# Patient Record
Sex: MALE | Race: WHITE | NOT HISPANIC OR LATINO | Employment: OTHER | URBAN - METROPOLITAN AREA
[De-identification: names, ages, dates, MRNs, and addresses within clinical notes are randomized per-mention and may not be internally consistent; named-entity substitution may affect disease eponyms.]

---

## 2021-05-04 ENCOUNTER — HISTORICAL (OUTPATIENT)
Dept: ANESTHESIOLOGY | Facility: HOSPITAL | Age: 81
End: 2021-05-04

## 2022-03-30 ENCOUNTER — HISTORICAL (OUTPATIENT)
Dept: ADMINISTRATIVE | Facility: HOSPITAL | Age: 82
End: 2022-03-30

## 2022-03-30 ENCOUNTER — HISTORICAL (OUTPATIENT)
Dept: RADIOLOGY | Facility: HOSPITAL | Age: 82
End: 2022-03-30

## 2022-04-12 ENCOUNTER — HISTORICAL (OUTPATIENT)
Dept: ADMINISTRATIVE | Facility: HOSPITAL | Age: 82
End: 2022-04-12

## 2022-04-30 VITALS
HEIGHT: 67 IN | BODY MASS INDEX: 25.74 KG/M2 | DIASTOLIC BLOOD PRESSURE: 64 MMHG | WEIGHT: 164 LBS | SYSTOLIC BLOOD PRESSURE: 130 MMHG

## 2022-04-30 NOTE — OP NOTE
Patient:   Moses Eugene             MRN: [id number]            FIN: [id number]               Age:   80 years     Sex:  Male     :  1940   Associated Diagnoses:   Abnormal weight loss   Author:   Dolly Vale MD      Operative Note   Operative Information   Date/ Time:  2021 08:58:00.     Procedures Performed: Procedure Code   Colonoscopy, flexible; with biopsy, single or multiple (30199).  Colonoscopy, flexible; diagnostic, including collection of specimen(s) by brushing or washing, when performed (separate procedure) (79320).  Colonoscopy through stoma; with directed submucosal injection(s), any substance (45248)..     Indications: 80-year-old white male with recent history of weight loss referred for diagnostic colonoscopy for evaluation of possible GI pathology.     Preoperative Diagnosis: Abnormal weight loss (XHB51-UH R63.4).     Postoperative Diagnosis: Abnormal weight loss (QUY09-SF R63.4).     Surgeon: Dolly Vale MD.     Anesthesia: Intravenous MAC.     Speciman Removed: Ascending colon nodule/polyp.     Description of Procedure/Findings/    Complications: Patient was brought to the endoscopy suite laid in the left lateral decubitus position right set up.  Intravenous anesthesia was provided.  Digital rectal exam performed exhibiting good anal rectal tone and no masses.  An endoscope was then passed through the anus intubating the rectum and with gentle deflation reaching the cecum.  Upon reaching the cecum pictures were taken the scope was then slowly withdrawn.  Overall the cecum appeared to be grossly normal.  Within the mid to proximal ascending colon an abnormal appearing region of colonic mucosa was identified.  It was either a very sessile polyp or abnormal mucosa but in either case it was biopsied and the region was injected with submucosal ink.  Scope was further withdrawn the remainder of the ascending transverse descending and rectosigmoid colon appear to be  grossly normal otherwise.  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  Scope was retroflexed in the distal rectum revealing normal-appearing perianal mucosa no significant hemorrhoids.  He was returned to neutral position the colon was evacuated of air.  Patient was relieved of anesthesia stable condition and transfer the postanesthesia care unit..     Esimated blood loss: loss  2  cc.     Complications: None.

## 2024-02-20 ENCOUNTER — HOSPITAL ENCOUNTER (EMERGENCY)
Facility: HOSPITAL | Age: 84
Discharge: HOME | End: 2024-02-20
Attending: INTERNAL MEDICINE
Payer: COMMERCIAL

## 2024-02-20 VITALS
WEIGHT: 169 LBS | RESPIRATION RATE: 18 BRPM | OXYGEN SATURATION: 95 % | TEMPERATURE: 99 F | SYSTOLIC BLOOD PRESSURE: 138 MMHG | HEART RATE: 66 BPM | BODY MASS INDEX: 26.49 KG/M2 | DIASTOLIC BLOOD PRESSURE: 87 MMHG
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
## 2024-02-20 DIAGNOSIS — S39.012A STRAIN OF BACK, INITIAL ENCOUNTER: Primary | ICD-10-CM

## 2024-02-20 DIAGNOSIS — M81.0 OSTEOPOROSIS, UNSPECIFIED OSTEOPOROSIS TYPE, UNSPECIFIED PATHOLOGICAL FRACTURE PRESENCE: ICD-10-CM

## 2024-02-20 PROCEDURE — 99284 EMERGENCY DEPT VISIT MOD MDM: CPT | Mod: 25

## 2024-02-20 RX ORDER — NAPROXEN 500 MG/1
500 TABLET ORAL 2 TIMES DAILY WITH MEALS
Qty: 30 TABLET | Refills: 0 | Status: SHIPPED | OUTPATIENT
Start: 2024-02-20 | End: 2024-03-06

## 2024-02-20 NOTE — ED PROVIDER NOTES
Encounter Date: 2/20/2024   History from patient     History     Chief Complaint   Patient presents with    Fall     C/o fall last night with lower back pain today     HPI    Moses Eugene is 83 y.o. male who  has a past medical history of Anxiety disorder, unspecified, Diabetes mellitus, Hypercholesterolemia, and Hypertension. arrives in ER with c/o Fall (C/o fall last night with lower back pain today)      Review of patient's allergies indicates:  No Known Allergies  Past Medical History:   Diagnosis Date    Anxiety disorder, unspecified     Diabetes mellitus     Hypercholesterolemia     Hypertension      Past Surgical History:   Procedure Laterality Date    CORONARY ARTERY BYPASS GRAFT      HERNIA REPAIR Right     Inguinal     Family History   Problem Relation Age of Onset    Hypertension Mother     Heart disease Mother     Hypertension Father     Heart disease Father      Social History     Tobacco Use    Smoking status: Never    Smokeless tobacco: Never   Substance Use Topics    Alcohol use: Not Currently    Drug use: Never     Review of Systems   Constitutional:  Negative for fever.   HENT:  Negative for trouble swallowing and voice change.    Eyes:  Negative for visual disturbance.   Respiratory:  Negative for cough and shortness of breath.    Cardiovascular:  Negative for chest pain.   Gastrointestinal:  Negative for abdominal pain, diarrhea and vomiting.   Genitourinary:  Negative for dysuria and hematuria.   Musculoskeletal:  Positive for back pain. Negative for gait problem.   Skin:  Negative for color change and rash.         Skin break on the left thumb   Neurological:  Negative for headaches.   Psychiatric/Behavioral:  Negative for behavioral problems and sleep disturbance.    All other systems reviewed and are negative.      Physical Exam     Initial Vitals [02/20/24 0947]   BP Pulse Resp Temp SpO2   128/66 (!) 54 18 98.6 °F (37 °C) 98 %      MAP       --         Physical Exam    Nursing note and  vitals reviewed.  Constitutional: He appears well-developed.   Eyes: EOM are normal. Pupils are equal, round, and reactive to light.   Neck: Neck supple.   Cardiovascular:  Normal rate.           Pulmonary/Chest: Breath sounds normal.   Abdominal: Abdomen is soft. Bowel sounds are normal.   Musculoskeletal:      Right shoulder: No tenderness.      Left shoulder: Tenderness present. No bony tenderness. Normal range of motion.      Right upper arm: No tenderness.      Right forearm: No tenderness.        Arms:       Cervical back: Neck supple. No tenderness or bony tenderness.      Thoracic back: Tenderness present. No bony tenderness.      Lumbar back: Tenderness present. No bony tenderness.        Back:      Neurological: He is alert. GCS score is 15. GCS eye subscore is 4. GCS verbal subscore is 5. GCS motor subscore is 6.   Skin:    V-shaped skin tear on the left thumb         ED Course   Procedures  Labs Reviewed - No data to display       Imaging Results              X-Ray Shoulder Trauma Left (Final result)  Result time 02/20/24 14:57:53      Final result by Ishan Keith MD (02/20/24 14:57:53)                   Impression:      1. No acute osseous defect identified  2. Osteoarthritis  3. Osteopenia  4. MR examination would allow further evaluation if clinically indicated      Electronically signed by: Ishan Keith  Date:    02/20/2024  Time:    14:57               Narrative:    EXAMINATION:  XR SHOULDER TRAUMA 3 VIEW LEFT    CLINICAL HISTORY:  Unspecified fall, initial encounter; .    COMPARISON:  None available.    FINDINGS:  Internal rotation, external rotation, and transscapular Y-views revealno definite fracture or dislocation.  Arthritic changes at the acromioclavicular and glenohumeral joint.  Bony structures are osteopenic.  No aggressive osteolytic or osteoblastic lesion is seen.                        Wet Read by Luz Elena Lucas MD (02/20/24 12:59:20, [facility] - Emergency Dept,  Emergency Medicine)     X-Ray, Independently Interpreted by Luz Elena Lucas MD.   Left shoulder three views:  No acute fractures identified, osteoporosis and DJD                                     X-Ray Lumbar Spine Ap And Lateral (Final result)  Result time 02/20/24 15:01:29      Final result by Ishan Keith MD (02/20/24 15:01:29)                   Impression:      1. Slight retrolisthesis of L1 on L2 and slight anterolisthesis of L4 on L5  2. Lumbar spondylosis  3. Osteopenia  4. Slight dextro convexity of the lumbar spine      Electronically signed by: Ishan Keith  Date:    02/20/2024  Time:    15:01               Narrative:    EXAMINATION:  XR LUMBAR SPINE AP AND LATERAL    CLINICAL HISTORY:  ; Fall;.    COMPARISON:  None available.    FINDINGS:  AP and lateral views reveal lumbar vertebral body height to be intact.  There is slight retrolisthesis of L1 on L2.  There is slight anterolisthesis of L4 on L5.  Disc space narrowing, osteophyte formation, and facet arthrosis are identified.  No lumbar fracture is seen.  Thoracic spine is discussed on the plain film exam of the thoracic spine of the same day.  Bony structures are osteopenic.  There is mild curvature of the lumbar spine with the convexity to the left.  Atherosclerosis is seen within the aorta.                        Wet Read by Luz Elena Lucas MD (02/20/24 12:59:04, [facility] - Emergency Dept, Emergency Medicine)      X-Ray, Independently Interpreted by Luz Elena Lucas MD.    Lumbar spine three views:  No acute fractures identified, the some wedging of some of the vertebral bodies,                                     X-Ray Thoracic Spine AP And Lateral (Final result)  Result time 02/20/24 11:52:37      Final result by Ishan Keith MD (02/20/24 11:52:37)                   Impression:      1. Mild anterior wedge configuration at T11 and to a lesser extent at T12  2. Thoracic spondylosis  3. Osteopenia  4. Mild dextroconvexity  of the thoracic spine      Electronically signed by: Ishan Keith  Date:    02/20/2024  Time:    11:52               Narrative:    EXAMINATION:  THORACIC SPINE:    CLINICAL HISTORY:  Unspecified fall, initial encounter,    COMPARISON:  None available    FINDINGS:  AP and lateral views reveal anterior wedge configuration evident at T11 and to a lesser extent at T12.  The uppermost thoracic spine is suboptimally visualized on the lateral view.  Disc space narrowing and osteophyte formation is seen.  Bony structures are osteopenic.  Post sternotomy changes are present.  There is mild curvature of the thoracic spine with the convexity to the right.                                       Medications - No data to display  Medical Decision Making    Moses Eugene is 83 y.o. male who  has a past medical history of Anxiety disorder, unspecified, Diabetes mellitus, Hypercholesterolemia, and Hypertension. arrives in ER with c/o Fall (C/o fall last night with lower back pain today)       Essentially patient says that he tripped on carpet and fell yesterday, had a little skin break on the left thumb he put the dressing on it, this morning woke up with some pain in the mid and lower back so called the ambulance to come to the emergency room.   He was able to ambulate at home before that, no distress, no deformities noted on examination, some tenderness in the lower scapular region and upper lumbar region, also patient reported that he has  pain in the left shoulder but he also says that that has been going on for a long time.    Amount and/or Complexity of Data Reviewed  Radiology: ordered and independent interpretation performed.    Risk  Prescription drug management.               ED Course as of 02/20/24 1734   Tue Feb 20, 2024   1300  Patient's x-ray shows some wedging of the vertebral bodies, no particular fractures, I will put him on pain medication and let him go home, will advise him to see his family doctor if he  continues to have pain or gets worse. [GQ]      ED Course User Index  [GQ] Luz Elena Lucas MD                           Clinical Impression:  Final diagnoses:  [W19.XXXA] Accidental fall, initial encounter  [S39.012A] Strain of back, initial encounter (Primary)  [M81.0] Osteoporosis, unspecified osteoporosis type, unspecified pathological fracture presence          ED Disposition Condition    Discharge Stable          ED Prescriptions       Medication Sig Dispense Start Date End Date Auth. Provider    naproxen (NAPROSYN) 500 MG tablet Take 1 tablet (500 mg total) by mouth 2 (two) times daily with meals. for 15 days 30 tablet 2/20/2024 3/6/2024 Luz Elena Lucas MD          Follow-up Information       Follow up With Specialties Details Why Contact Info    Barbara Leach MD Family Medicine In 2 days  [address] [place] [id number] [phone]               Luz Elena Lucas MD  02/20/24 [id number]       Luz Elena Lucas MD  02/20/24 2050

## 2024-02-20 NOTE — DISCHARGE INSTRUCTIONS
.    Take medicines as prescribed    See your family doctor in one to 2 days for further evaluation, workup, and treatment as necessary    Avoid driving or operating machinery while taking medicines as some medicines might cause drowsiness and may cause problems. Also pain medicines have potential of being addictive  so use Pain meds specially Narcotics Sparingly.    The exam and treatment you received in Emergency Room was for an urgent problem and NOT INTENDED AS COMPLETE CARE. It is important that you FOLLOW UP with a doctor for ongoing care. If your symptoms become WORSE or you DO NOT IMPROVE and you are unable to reach your health care provider, you should RETURN to the emergency department. The Emergency Room doctor has provided a PRELIMINARY INTERPRETATION of all your STUDIES. A final interpretation may be done after you are discharged. IF A CHANGE in your diagnosis or treatment is needed WE WILL CONTACT YOU. It is critical that we have a CURRENT PHONE NUMBER FOR YOU.

## 2024-02-23 DIAGNOSIS — M43.16 SPONDYLOLISTHESIS, LUMBAR REGION: Primary | ICD-10-CM

## 2024-02-23 DIAGNOSIS — S22.080A WEDGE COMPRESSION FRACTURE OF T11-T12 VERTEBRA, INITIAL ENCOUNTER FOR CLOSED FRACTURE: ICD-10-CM

## 2024-02-27 ENCOUNTER — HOSPITAL ENCOUNTER (OUTPATIENT)
Dept: RADIOLOGY | Facility: HOSPITAL | Age: 84
Discharge: HOME | End: 2024-02-27
Attending: FAMILY MEDICINE
Payer: COMMERCIAL

## 2024-02-27 DIAGNOSIS — M43.16 SPONDYLOLISTHESIS, LUMBAR REGION: ICD-10-CM

## 2024-02-27 DIAGNOSIS — S22.080A WEDGE COMPRESSION FRACTURE OF T11-T12 VERTEBRA, INITIAL ENCOUNTER FOR CLOSED FRACTURE: ICD-10-CM

## 2024-02-27 PROCEDURE — 72146 MRI CHEST SPINE W/O DYE: CPT | Mod: TC

## 2024-02-27 PROCEDURE — 72148 MRI LUMBAR SPINE W/O DYE: CPT | Mod: TC

## 2024-03-06 DIAGNOSIS — E29.1 MALE HYPOGONADISM: Primary | ICD-10-CM

## 2024-03-06 DIAGNOSIS — M85.88 OSTEOPENIA OF SPINE: Primary | ICD-10-CM

## 2024-03-15 ENCOUNTER — HOSPITAL ENCOUNTER (OUTPATIENT)
Dept: RADIOLOGY | Facility: HOSPITAL | Age: 84
Discharge: HOME | End: 2024-03-15
Attending: FAMILY MEDICINE
Payer: COMMERCIAL

## 2024-03-15 DIAGNOSIS — M85.88 OSTEOPENIA OF SPINE: ICD-10-CM

## 2024-03-15 PROCEDURE — 77080 DXA BONE DENSITY AXIAL: CPT | Mod: TC
